# Patient Record
Sex: MALE | Race: ASIAN | NOT HISPANIC OR LATINO | URBAN - METROPOLITAN AREA
[De-identification: names, ages, dates, MRNs, and addresses within clinical notes are randomized per-mention and may not be internally consistent; named-entity substitution may affect disease eponyms.]

---

## 2017-01-06 ENCOUNTER — OFFICE VISIT - HEALTHEAST (OUTPATIENT)
Dept: FAMILY MEDICINE | Facility: CLINIC | Age: 22
End: 2017-01-06

## 2017-01-06 DIAGNOSIS — Z23 NEED FOR VACCINATION: ICD-10-CM

## 2017-01-06 DIAGNOSIS — Z00.00 ROUTINE GENERAL MEDICAL EXAMINATION AT A HEALTH CARE FACILITY: ICD-10-CM

## 2017-01-06 ASSESSMENT — MIFFLIN-ST. JEOR: SCORE: 1556.45

## 2017-05-01 ENCOUNTER — COMMUNICATION - HEALTHEAST (OUTPATIENT)
Dept: FAMILY MEDICINE | Facility: CLINIC | Age: 22
End: 2017-05-01

## 2017-05-02 ENCOUNTER — OFFICE VISIT - HEALTHEAST (OUTPATIENT)
Dept: FAMILY MEDICINE | Facility: CLINIC | Age: 22
End: 2017-05-02

## 2017-05-02 DIAGNOSIS — K12.0 APHTHOUS ULCER OF MOUTH: ICD-10-CM

## 2017-05-02 DIAGNOSIS — K13.70 ORAL MUCOSAL LESION: ICD-10-CM

## 2017-05-02 DIAGNOSIS — R21 RASH: ICD-10-CM

## 2017-05-02 RX ORDER — CALAMINE 8% AND ZINC OXIDE 8% 160 MG/ML
LOTION TOPICAL
Qty: 120 ML | Refills: 0 | Status: SHIPPED | OUTPATIENT
Start: 2017-05-02

## 2018-12-21 ENCOUNTER — COMMUNICATION - HEALTHEAST (OUTPATIENT)
Dept: FAMILY MEDICINE | Facility: CLINIC | Age: 23
End: 2018-12-21

## 2019-01-08 ENCOUNTER — OFFICE VISIT - HEALTHEAST (OUTPATIENT)
Dept: FAMILY MEDICINE | Facility: CLINIC | Age: 24
End: 2019-01-08

## 2019-01-08 DIAGNOSIS — Z11.1 SCREENING EXAMINATION FOR PULMONARY TUBERCULOSIS: ICD-10-CM

## 2019-01-08 ASSESSMENT — MIFFLIN-ST. JEOR: SCORE: 1576.86

## 2019-01-11 LAB
GAMMA INTERFERON BACKGROUND BLD IA-ACNC: 0.06 IU/ML
M TB IFN-G BLD-IMP: NEGATIVE
MITOGEN IGNF BCKGRD COR BLD-ACNC: -0.02 IU/ML
MITOGEN IGNF BCKGRD COR BLD-ACNC: 0.01 IU/ML
QTF INTERPRETATION: NORMAL
QTF MITOGEN - NIL: 7.71 IU/ML

## 2021-05-30 VITALS — WEIGHT: 134 LBS | BODY MASS INDEX: 21.03 KG/M2 | HEIGHT: 67 IN

## 2021-05-30 VITALS — BODY MASS INDEX: 20.96 KG/M2 | WEIGHT: 133.8 LBS

## 2021-06-02 VITALS — WEIGHT: 138.5 LBS | BODY MASS INDEX: 21.74 KG/M2 | HEIGHT: 67 IN

## 2021-06-08 NOTE — PROGRESS NOTES
"OFFICE VISIT NOTE  PHQ-2 Total Score: 0 (11/17/2016 11:00 AM)    Subjective:   Chief Complaint:  update immunizations    21 y.o. male.  No Patient Care Coordination Note on file.    He was given a paper from school that he needs more immunizations. He brought that paper in. He feels fine.    Allergies: he has No Known Allergies.  Review of Systems:  No fever.    Objective:      Visit Vitals     BP 94/60     Pulse 84     Temp 98.6  F (37  C) (Oral)     Resp 12     Ht 5' 7\" (1.702 m)     Wt 134 lb (60.8 kg)     SpO2 98%     BMI 20.99 kg/m2     GENERAL: No acute distress.  No exam    Assessment & Plan   [facility] Virgilio is a 21 y.o. male.    School immunizations need to be updated. A new copy of his immunizations were given to him to give to his school. No varicella is needed (in fact he's had 4 of those).  He can have HPV #3, so we gave that.  Diagnoses and all orders for this visit:    Need for vaccination  -     HPV vaccine 9 valent 3 dose IM    Routine general medical examination at a health care facility         Malena Young MD    "

## 2021-06-10 NOTE — PROGRESS NOTES
HPI - 20 yo male here with rash on neck and arms that he first noticed 5 days ago.     Itchy and covering most of body  No pain  Increasing  Never had this before  No new food, soap, shampoo, laundry detergent, not at park or fishing  Not tried anything  No one else has this    REVIEW OF SYSTEMS  General: no weight changes, fatigue  HEENT:  no HA,  no vision changes, URI sx  Respiratory:  no cough, dyspnea  Cardiovascular: no chest pain, palpitations  Gastrointestinal: no nausea/vomiting, diarrhea, yes = constipation  : no dysuria  Neurologic: no seizures, focal weakness, tremors  Skin: see HPI      No current outpatient prescriptions on file.   '  Vitals:    05/02/17 1547   BP: 112/76   Pulse: 80   Weight: 133 lb 12.8 oz (60.7 kg)     OBJECTIVE:  Vitals listed above within normal limits  General appearance: well groomed, pleasant, well hydrated, nontoxic appearing  ENT: PERRL, throat apthous ulcer on posterior right pharnyx  Neck: neck supple, no lymphadenopathy, no thyromegaly  Lungs: lungs clear to auscultation bilaterally, no wheezes or rhonchi  Heart: regular rate and rhythm, no murmurs, rubs or gallops  Abdomen: soft, nontender  Neuro: no focal deficits, CN II-XII grossly intact, alert and oriented  Psych:  mood stable, appears to have good insight and judgment  Skin: clusters of papules, no pustules, not c/w hives    A/P  1. Rash  Unclear etiology, no clear trigger  - diphenhydrAMINE (BENADRYL) 25 mg capsule; Take 1 capsule (25 mg total) by mouth every 4 (four) hours as needed for itching.  Dispense: 30 capsule; Refill: 2  - calamine-zinc oxide topical lotion; Apply to affected area 2-3 time day as needed  Dispense: 120 mL; Refill: 0  RTC in a couple of days for recheck    2. Oral mucosal lesion  Advised to gargle with salt water  - benzocaine (ORAJEL) 10 % mucosal gel; Apply to the mouth or throat 3 (three) times a day as needed for pain.  Dispense: 5.3 g; Refill: 0

## 2021-12-31 ENCOUNTER — IMMUNIZATION (OUTPATIENT)
Dept: NURSING | Facility: CLINIC | Age: 26
End: 2021-12-31
Payer: COMMERCIAL

## 2021-12-31 PROCEDURE — 90471 IMMUNIZATION ADMIN: CPT

## 2021-12-31 PROCEDURE — 90686 IIV4 VACC NO PRSV 0.5 ML IM: CPT

## 2021-12-31 PROCEDURE — 0001A PR COVID VAC PFIZER DIL RECON 30 MCG/0.3 ML IM: CPT

## 2021-12-31 PROCEDURE — 91300 PR COVID VAC PFIZER DIL RECON 30 MCG/0.3 ML IM: CPT

## 2022-01-21 ENCOUNTER — IMMUNIZATION (OUTPATIENT)
Dept: NURSING | Facility: CLINIC | Age: 27
End: 2022-01-21
Attending: FAMILY MEDICINE
Payer: COMMERCIAL

## 2022-01-21 PROCEDURE — 0052A COVID-19,PF,PFIZER (12+ YRS): CPT

## 2022-01-21 PROCEDURE — 91305 COVID-19,PF,PFIZER (12+ YRS): CPT

## 2024-10-13 ENCOUNTER — TRANSFERRED RECORDS (OUTPATIENT)
Dept: HEALTH INFORMATION MANAGEMENT | Facility: CLINIC | Age: 29
End: 2024-10-13
Payer: COMMERCIAL